# Patient Record
Sex: FEMALE | Race: WHITE | NOT HISPANIC OR LATINO | URBAN - METROPOLITAN AREA
[De-identification: names, ages, dates, MRNs, and addresses within clinical notes are randomized per-mention and may not be internally consistent; named-entity substitution may affect disease eponyms.]

---

## 2023-06-29 ENCOUNTER — OFFICE (OUTPATIENT)
Dept: URBAN - METROPOLITAN AREA CLINIC 34 | Facility: CLINIC | Age: 58
End: 2023-06-29

## 2023-06-29 VITALS
SYSTOLIC BLOOD PRESSURE: 144 MMHG | HEIGHT: 63 IN | DIASTOLIC BLOOD PRESSURE: 87 MMHG | WEIGHT: 176 LBS | HEART RATE: 99 BPM | TEMPERATURE: 98 F

## 2023-06-29 DIAGNOSIS — Z87.09 PERSONAL HISTORY OF OTHER DISEASES OF THE RESPIRATORY SYSTEM: ICD-10-CM

## 2023-06-29 DIAGNOSIS — K57.92 DIVERTICULITIS OF INTESTINE, PART UNSPECIFIED, WITHOUT PERFO: ICD-10-CM

## 2023-06-29 DIAGNOSIS — R19.4 CHANGE IN BOWEL HABIT: ICD-10-CM

## 2023-06-29 DIAGNOSIS — K59.09 OTHER CONSTIPATION: ICD-10-CM

## 2023-06-29 DIAGNOSIS — R11.2 NAUSEA WITH VOMITING, UNSPECIFIED: ICD-10-CM

## 2023-06-29 PROCEDURE — 99204 OFFICE O/P NEW MOD 45 MIN: CPT | Performed by: PHYSICIAN ASSISTANT

## 2023-06-29 RX ORDER — POLYETHYLENE GLYCOL 3350 17 G/17G
POWDER, FOR SOLUTION ORAL
Qty: 1 | Refills: 0 | Status: COMPLETED
Start: 2023-06-29 | End: 2023-09-27

## 2023-06-29 NOTE — SERVICEHPINOTES
Ms. Meier is a 58 YoF with history of asthma and allergies who presents to the office for ER follow up after diagnosis with acute diverticulitis in May/2023. She initially had severe LLQ pain and change in bowel habits (constipation dominant), isolated episode of diarrhea/bloody/dark stool. She currently continues with some mild residual lower abdominal cramping, discomfort, and bloating, occasional queasiness, but has not had recurrence of severe pain or bleeding - seems to be diet dependent, improves when eating bland/low residue diet which has been challenging for her to maintain. She continues to feel somewhat constipated. Prior to this episode in May she had regular BMs soft/formed at least 1x/day, currently having 0-3BMs. She previously was on augmentin for a sinus infection and reports SE of severe diarrhea (would prefer to avoid if possible).br 
br ER records reviewed: 5/9 CT abdomen/pelvis w/ mild acute diverticulitis of mid-descending colon. Prescribed Cipro/Flagyl, morphine, Zofran. Labs w/ borderline Hgb 11.9, MCV 76.8 (L) normal WBC and platelets. CMP with mildly elevated ALT (55), creatinine 1.12, otherwise unremarkable. Lipase 22. 
br 5/15 - Repeat CT abd/pelv for worsening sx demonstrated descending colonic diverticulitis questionable small estrellita-colonic abscess, circumferential ascending colonic wall thickening. She had surgical consult at [facility] w/ only medical [facility]'ed.br
 She completed a second course of cipro/flagyl around May 24th.Hx abdominal surgeries: C section, appendectomy, tummy-tuck. Last screening colonoscopy reported ~8yrs ago, normal.brShe has significant Hx of asthma, required hospitalization/intubation for a severe exacerbation a few years ago. Since then she has been stable on inhaler and allergy regimen. No other pulm hx. No prior reactions to anesthesia apart from nausea upon waking. No cardiac conditions, AC, antiplt, or NSAIDs.

## 2023-09-27 ENCOUNTER — OFFICE (OUTPATIENT)
Dept: URBAN - METROPOLITAN AREA PATHOLOGY 18 | Facility: PATHOLOGY | Age: 58
End: 2023-09-27
Payer: OTHER GOVERNMENT

## 2023-09-27 ENCOUNTER — OFFICE (OUTPATIENT)
Dept: URBAN - METROPOLITAN AREA CLINIC 30 | Facility: CLINIC | Age: 58
End: 2023-09-27

## 2023-09-27 VITALS
HEIGHT: 63 IN | SYSTOLIC BLOOD PRESSURE: 126 MMHG | HEART RATE: 93 BPM | SYSTOLIC BLOOD PRESSURE: 131 MMHG | HEART RATE: 97 BPM | OXYGEN SATURATION: 98 % | DIASTOLIC BLOOD PRESSURE: 67 MMHG | DIASTOLIC BLOOD PRESSURE: 84 MMHG | SYSTOLIC BLOOD PRESSURE: 112 MMHG | WEIGHT: 158 LBS | HEART RATE: 89 BPM | SYSTOLIC BLOOD PRESSURE: 107 MMHG | DIASTOLIC BLOOD PRESSURE: 62 MMHG | OXYGEN SATURATION: 95 % | DIASTOLIC BLOOD PRESSURE: 63 MMHG | TEMPERATURE: 97.8 F | HEART RATE: 88 BPM | DIASTOLIC BLOOD PRESSURE: 74 MMHG | OXYGEN SATURATION: 100 % | SYSTOLIC BLOOD PRESSURE: 120 MMHG | DIASTOLIC BLOOD PRESSURE: 68 MMHG | RESPIRATION RATE: 16 BRPM | RESPIRATION RATE: 14 BRPM | HEART RATE: 100 BPM | TEMPERATURE: 98.1 F | SYSTOLIC BLOOD PRESSURE: 139 MMHG | SYSTOLIC BLOOD PRESSURE: 115 MMHG

## 2023-09-27 DIAGNOSIS — K57.30 DIVERTICULOSIS OF LARGE INTESTINE WITHOUT PERFORATION OR ABS: ICD-10-CM

## 2023-09-27 DIAGNOSIS — Z87.19 PERSONAL HISTORY OF OTHER DISEASES OF THE DIGESTIVE SYSTEM: ICD-10-CM

## 2023-09-27 DIAGNOSIS — R19.4 CHANGE IN BOWEL HABIT: ICD-10-CM

## 2023-09-27 DIAGNOSIS — K64.9 UNSPECIFIED HEMORRHOIDS: ICD-10-CM

## 2023-09-27 DIAGNOSIS — D12.0 BENIGN NEOPLASM OF CECUM: ICD-10-CM

## 2023-09-27 PROBLEM — K63.5 POLYP OF COLON: Status: ACTIVE | Noted: 2023-09-27

## 2023-09-27 PROCEDURE — 88305 TISSUE EXAM BY PATHOLOGIST: CPT | Performed by: PATHOLOGY
